# Patient Record
(demographics unavailable — no encounter records)

---

## 2024-10-16 NOTE — PHYSICAL EXAM
[Chaperone Present] : A chaperone was present in the examining room during all aspects of the physical examination [[id number]] : A chaperone was present during the pelvic exam. [Awake] : awake [Alert] : alert [Acute Distress] : no acute distress [Soft] : soft [Tender] : non tender [Oriented x3] : oriented to person, place, and time [Normal] : uterus [Discharge] : a  ~M vaginal discharge was present [Moderate] : moderate [Foul Smelling] : foul smelling [No Bleeding] : there was no active vaginal bleeding [Uterine Adnexae] : were not tender and not enlarged

## 2025-01-29 NOTE — HISTORY OF PRESENT ILLNESS
[FreeTextEntry1] : Follow-up iron deficiency anemia, prediabetes, hyperlipidemia, vitamin D deficiency [de-identified] : Martine is a 31 yo F w PMHx gestational diabetes, iron deficiency anemia, idiopathic leukocytosis having nasal congestion for a few weeks - was sick during claudia, denies any sinus pain, ear pain or sore throat, no fever or chills Iron def / Anemia - is on supplements  c/o diffuse bilateral dull aching headaches for 2 mo - gets once a week  more when she gets her period , takes motrin with help  feels stressed out at work -feels she is being given too much work, works for [employer] in [place]. Denies any depression or anxiety.  No SI/HI, takes care of 2 young children at home.

## 2025-01-29 NOTE — PLAN
[FreeTextEntry1] : A/P: Anemia/iron deficiency: To continue with OTC iron replacement, recheck labs and reassess. Vitamin D deficiency: To continue with OTC replacement, recheck and reassess. Nasal symptoms suspect rhinitis: To try nasal spray as ordered, call/RTO if symptoms worsen or do not improve. Frequent headaches:?  Menstruation related?  Stress associated advised to keep headache diary, try to avoid triggers, adequate sleep hygiene discussed, OTC NSAIDs as needed, refer to neurology. Prediabetes: Advised on dietary modification daily regular exercise, recheck A1c and reassess. Follow-up after neurology evaluation.

## 2025-02-04 NOTE — PHYSICAL EXAM
[Normal] : normal gait, coordination grossly intact, no focal deficits and deep tendon reflexes were 2+ and symmetric [de-identified] : Bilateral maxillary sinus tenderness on palpation

## 2025-02-04 NOTE — HISTORY OF PRESENT ILLNESS
[FreeTextEntry1] : anemia / discuss labs / sinus pain and congestion [de-identified] : Martine is a 33 yo F w PMHx gestational diabetes, iron deficiency anemia, idiopathic leukocytosis  Iron def / Anemia - is on supplements Ongoing headaches: Has been advised to consult neurology. ?  Tension headaches?  Migraines  had diarrhea / nausea 4 days ago - feels she has a fever today feels better, is tolerating oral and is hydrating well with Pedialyte. kids are sick also , also c/o sinus congestion / pain and cough  had myalgias earlier in the day. gave urine sample when she had her period, denies any urinary symptoms. Not pregnant currently

## 2025-02-04 NOTE — PLAN
[FreeTextEntry1] : A/P: Acute sinusitis: Recommend supportive care including antipyretics, fluids, OTC cough/cold medications if age-appropriate, and nasal saline followed by nasal suction. Return if symptoms worsen or persist.  Anemia/iron deficiency: To continue with OTC iron replacement, repeat labs discussed.  Vitamin D deficiency: To continue with OTC replacement,   Frequent headaches:?  Menstruation related?  Stress associated advised to keep headache diary, try to avoid triggers, adequate sleep hygiene discussed, OTC NSAIDs as needed, refer to neurology.  Prediabetes: Advised on dietary modification daily regular exercise, recheck A1c and reassess.  In 3 months. To repeat UA midcycle, denies any urinary symptoms. Follow-up after neurology evaluation.

## 2025-07-18 NOTE — ASSESSMENT
[Vaccines Reviewed] : Immunizations reviewed today. Please see immunization details in the vaccine log within the immunization flowsheet.  [FreeTextEntry1] :  #Health Maintenance - A1C, CBC, CMP, Lipid Profile, TSH ordered - Urinalysis ordered - Diet and Exercise advised - Depression Screening: PHQ-2=0 - HepC Screening One time: Performed - STI/HIV Screening: Performed - GYN: Pap Smear: Not performed advised to schedule - Will follow-up labs and relay further management based off results - Patient is asked to follow-up on any questions or concerns they may have  #Recurrent UTIs vs. Urinary Tract Mucosa Infection - Burning around urinary tract area - UA today - Consider Fosfomycin 1g + Pyridium if positive - Will F/U  #Breast Irritation - Irritation and itching around nipples during period time - Start Hydrocortisone 1% cream - Monitor  #  - F/U Iron levels/CBC - Continue OTC Iron supplementation; refilled  #Prediabetes - A1c performed as above - Monitor after results with labs  #Vit D Deficiency - Vit D Levels ordered - Continue Ergocalciferol 50K U once weekly; refilled  Patient was advised to contact the office or seek emergency medical care for any new, worsening or concerning symptoms. Patient verbalized understanding and is in agreement with the above plan.  F/U: 6 months or PRN  Case discussed with attending, Dr. Gladis Rizo M.D. Internal Medicine, PGY-3

## 2025-07-18 NOTE — HISTORY OF PRESENT ILLNESS
[FreeTextEntry1] : CPE [de-identified] : 34 year old female with PMHx of Iron Deficiency Anemia, Prediabetes, Gestational Diabetes, Vit D Deficiency presents to the office for CPE. Patient has no acute complaints today. Patient states that they would like to get their labs done and have a routine checkup. Patient states she had burning around the urinary tract, was recently prescribed antibiotics and intravaginal meds by OB for coverage of antifungal. She states that her period exacerbates the symptoms after some relief after her meds. Patient also concerned about her prediabetes, fatigue. Would like help with that  Denies chest pain, shortness of breath, nausea, vomiting, diarrhea, fevers, chills

## 2025-07-18 NOTE — HEALTH RISK ASSESSMENT
[Very Good] : ~his/her~  mood as very good [Yes] : Yes [Monthly or less (1 pt)] : Monthly or less (1 point) [1 or 2 (0 pts)] : 1 or 2 (0 points) [Never (0 pts)] : Never (0 points) [No] : In the past 12 months have you used drugs other than those required for medical reasons? No [No falls in past year] : Patient reported no falls in the past year [0] : 2) Feeling down, depressed, or hopeless: Not at all (0) [PHQ-2 Negative - No further assessment needed] : PHQ-2 Negative - No further assessment needed [Never] : Never [Patient reported PAP Smear was normal] : Patient reported PAP Smear was normal [HIV Test offered] : HIV Test offered [Hepatitis C test offered] : Hepatitis C test offered [None] : None [With Family] : lives with family [# of Members in Household ___] :  household currently consist of [unfilled] member(s) [] :  [# Of Children ___] : has [unfilled] children [Sexually Active] : sexually active [Feels Safe at Home] : Feels safe at home [Fully functional (bathing, dressing, toileting, transferring, walking, feeding)] : Fully functional (bathing, dressing, toileting, transferring, walking, feeding) [Fully functional (using the telephone, shopping, preparing meals, housekeeping, doing laundry, using] : Fully functional and needs no help or supervision to perform IADLs (using the telephone, shopping, preparing meals, housekeeping, doing laundry, using transportation, managing medications and managing finances) [With Patient/Caregiver] : , with patient/caregiver [Designated Healthcare Proxy] : Designated healthcare proxy [Name: ___] : Health Care Proxy's Name: [unfilled]  [Relationship: ___] : Relationship: [unfilled] [Aggressive treatment] : aggressive treatment [I will adhere to the patient's wishes.] : I will adhere to the patient's wishes. [Audit-CScore] : 1 [de-identified] : terrible - rice and bread/ pasta  [[id number]] : 0 [Change in mental status noted] : No change in mental status noted [Language] : denies difficulty with language [Behavior] : denies difficulty with behavior [Learning/Retaining New Information] : denies difficulty learning/retaining new information [Handling Complex Tasks] : denies difficulty handling complex tasks [Reasoning] : denies difficulty with reasoning [Spatial Ability and Orientation] : denies difficulty with spatial ability and orientation [High Risk Behavior] : no high risk behavior [Reports changes in hearing] : Reports no changes in hearing [Reports changes in vision] : Reports no changes in vision [PapSmearDate] : 9/2022 [HIVComments] : Performed [HepatitisCComments] : Performed [FreeTextEntry2] : 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